# Patient Record
Sex: FEMALE | Race: WHITE | NOT HISPANIC OR LATINO
[De-identification: names, ages, dates, MRNs, and addresses within clinical notes are randomized per-mention and may not be internally consistent; named-entity substitution may affect disease eponyms.]

---

## 2017-09-17 ENCOUNTER — TRANSCRIPTION ENCOUNTER (OUTPATIENT)
Age: 22
End: 2017-09-17

## 2017-10-07 ENCOUNTER — TRANSCRIPTION ENCOUNTER (OUTPATIENT)
Age: 22
End: 2017-10-07

## 2017-10-18 ENCOUNTER — TRANSCRIPTION ENCOUNTER (OUTPATIENT)
Age: 22
End: 2017-10-18

## 2018-10-08 ENCOUNTER — EMERGENCY (EMERGENCY)
Facility: HOSPITAL | Age: 23
LOS: 0 days | Discharge: HOME | End: 2018-10-09
Attending: EMERGENCY MEDICINE | Admitting: EMERGENCY MEDICINE

## 2018-10-08 VITALS
HEART RATE: 90 BPM | TEMPERATURE: 98 F | WEIGHT: 134.92 LBS | OXYGEN SATURATION: 98 % | RESPIRATION RATE: 18 BRPM | DIASTOLIC BLOOD PRESSURE: 81 MMHG | HEIGHT: 63 IN | SYSTOLIC BLOOD PRESSURE: 118 MMHG

## 2018-10-08 DIAGNOSIS — Z91.018 ALLERGY TO OTHER FOODS: ICD-10-CM

## 2018-10-08 DIAGNOSIS — R42 DIZZINESS AND GIDDINESS: ICD-10-CM

## 2018-10-08 DIAGNOSIS — Z98.890 OTHER SPECIFIED POSTPROCEDURAL STATES: ICD-10-CM

## 2018-10-08 DIAGNOSIS — Z98.890 OTHER SPECIFIED POSTPROCEDURAL STATES: Chronic | ICD-10-CM

## 2018-10-08 DIAGNOSIS — R11.0 NAUSEA: ICD-10-CM

## 2018-10-08 NOTE — ED PROVIDER NOTE - OBJECTIVE STATEMENT
23yF no pmhx  p/w  senstion if room spinnign  started 3 days ago  when she bent over - since then intermittent  dizziness - ,  berny  more  constant  + URI  1 week ago - no syncope chest pain sob, 1 episode of vomiting  no vaginal bleeding. no headache neck antonio or  head trauma 23yF no pmhx  p/w  sensation if room spinning  started 3 days ago  when she bent over - since then intermittent  dizziness - ,  berny  more  constant  + URI  1 week ago - no syncope chest pain sob, 1 episode of vomiting  no vaginal bleeding. no headache neck pain or  head trauma. seen in C today  prescriebd  zyrtec and  flonase  -  as well as meclziine and zofran -  told not to take meclizine or zofran until 1 week if symptoms persist

## 2018-10-08 NOTE — ED PROVIDER NOTE - MEDICAL DECISION MAKING DETAILS
Pt feels better -  instructed patient and parents in ED to give meclizine they  just filled at pharmacy -   reviewed medication -  pt  took 2 doses in  ED -  feesl better  -  will follow with ent

## 2018-10-08 NOTE — ED ADULT NURSE NOTE - PRO INTERPRETER NEED 2
07/24/2018 (RETINA)- OCT/FA/FP/IRIS at the next visit. Recommended following up in 2 months for a dilated recheck. English

## 2018-10-08 NOTE — ED ADULT TRIAGE NOTE - CHIEF COMPLAINT QUOTE
pt c/o dizziness x 3 days went to urgent care was prescribed zytrec and flonase, not feeling better.

## 2018-10-08 NOTE — ED PROVIDER NOTE - PROGRESS NOTE DETAILS
pt feels better parents  want to take her howm and follow with ENT -  they have  meclizine and zofran prescribed by  c

## 2018-10-08 NOTE — ED PROVIDER NOTE - PHYSICAL EXAMINATION
VITAL SIGNS: I have reviewed nursing notes and confirm.  CONSTITUTIONAL: well-appearing, non-toxic, NAD  SKIN: Warm dry, normal skin turgor  HEAD: NCAT  EYES: EOMI, PERRLA, no scleral icterus  ENT: TM's normal b/l, no sinus tenderness to percussion, normal dental exam, normal pharynx  NECK: Supple; non tender. Full ROM. No cervical LAD  CARD: RRR, no murmurs, rubs or gallops  RESP: clear to ausculation b/l.  No rales, rhonchi, or wheezing.  ABD: soft, + BS, non-tender,   EXT: Full ROM, no bony tenderness, no pedal edema, no calf tenderness  NEURO: normal motor. normal sensory. CN II-XII intact. Cerebellar testing normal. Normal gait.  PSYCH: Cooperative, appropriate.

## 2018-10-08 NOTE — ED PROVIDER NOTE - NS ED ROS FT
Constitutional:  No fever, chills, lethargy, or abnormal weight loss  Eyes:  No eye pain or visual changes  ENMT: No nasal discharge, no toothache, no sore throat. No neck pain or stiffness  Cardiac:  No chest pain or palpitations  Respiratory:  No cough or respiratory distress.   GI: hpi  :  No dysuria, frequency or burning.  Neuro:  No headache. No numbness, weakness, or tingling. + dizziness  Skin:  No skin rash  Except as documented in the HPI,  all other systems are negative

## 2018-10-09 ENCOUNTER — TRANSCRIPTION ENCOUNTER (OUTPATIENT)
Age: 23
End: 2018-10-09

## 2019-02-27 ENCOUNTER — TRANSCRIPTION ENCOUNTER (OUTPATIENT)
Age: 24
End: 2019-02-27

## 2019-08-03 ENCOUNTER — TRANSCRIPTION ENCOUNTER (OUTPATIENT)
Age: 24
End: 2019-08-03

## 2019-11-06 NOTE — ED ADULT TRIAGE NOTE - MODE OF ARRIVAL
[facility]. [facility]. Spoke to Mid Coast [facility]. Faxed order. Private Vehicle

## 2019-12-12 ENCOUNTER — TRANSCRIPTION ENCOUNTER (OUTPATIENT)
Age: 24
End: 2019-12-12

## 2020-10-29 ENCOUNTER — TRANSCRIPTION ENCOUNTER (OUTPATIENT)
Age: 25
End: 2020-10-29